# Patient Record
(demographics unavailable — no encounter records)

---

## 2024-11-01 NOTE — HISTORY OF PRESENT ILLNESS
[FreeTextEntry6] : cold x 1 week cough x1 week losing voice today sore throat, but no trouble swallowing no fever no N/V/D dad with bronchitis, mom with sinus infection

## 2024-11-01 NOTE — PHYSICAL EXAM
[Clear to Auscultation Bilaterally] : clear to auscultation bilaterally [NL] : warm, clear [FreeTextEntry7] : diminoished breath sounds LLL but otherwise CTAB

## 2024-11-01 NOTE — DISCUSSION/SUMMARY
[FreeTextEntry1] : 16 year old with cough x >1 week dad on zpack for lung infection, mom with sinusitits patient has swim competition no signs of sinus infection will start zpack for atypical PNA, but if no improvement she needs to be evaluated again mother agrees

## 2024-11-19 NOTE — HISTORY OF PRESENT ILLNESS
[de-identified] : Sore throat [FreeTextEntry6] : 3 days sore throat achey, cough, some congestion No fevers  For past 2 months been feeling tired and run down

## 2024-11-19 NOTE — DISCUSSION/SUMMARY
[FreeTextEntry1] : 15 yo w sore throat. Rapid strep is negative. Will send culture. Mom declines viral testing. In terms of the ongoing tiredness, explained to mom don't want to send labs while in the middle of acute viral illness. See how she feels after this illness and if still having concerns return for re-eval. Had normal CBC, TFTs, CMP a few months ago. Periods are regular LMP was 2 weeks ago.

## 2025-01-13 NOTE — HISTORY OF PRESENT ILLNESS
[FreeTextEntry6] : went to UC last week feeling run down and feeling tired no fevers  went to the ER September and Novemeber for ruptured ovarian cysts- Saw GYN who recommended OCP but patient declined

## 2025-02-26 NOTE — HISTORY OF PRESENT ILLNESS
[de-identified] : NIKKO Brewster note-  02/21/2025: ([employer]) She presents with her mother for orthopedic evaluation. States 2+ week history of worsening lower back pain. Denies specific injury. She is now noting lateral right thigh radicular symptoms over the last 48 hours. She denies bowel or bladder complaints. She is limping and having difficulties with activities of daily living. She has tried ibuprofen 400 mg with mild help.  2/21/25: Lumbar MRI - report noted in chart.   Ind. review- R paracentral HNP L5/S1 ======================================================================== 02/26/2025: Pt presents with lower back pain, that has been present for years, recently worsened after Pilates. Now with radiating pain and tingling down b/l legs. She presented [facility] had [facility] completed, presents for f/u. Pain is 80/20 RLE>LLE.

## 2025-02-26 NOTE — IMAGING
[de-identified] : LSPINE Inspection: No rash or ecchymosis Palpation: No tenderness to palpation or spasm in bilateral thoracic and lumbar paraspinal musculature, no SI joint tenderness to palpation ROM: Full with no pain Strength: 5/5 bilateral hip flexors, knee extensors, ankle dorsiflexors, EHL, ankle plantarflexors Sensation: Sensation present to light touch bilateral L2-S1 distributions Provocative maneuvers: Positive bilateral straight leg raise

## 2025-02-26 NOTE — HISTORY OF PRESENT ILLNESS
[de-identified] : NIKKO Brewster note-  02/21/2025: ([employer]) She presents with her mother for orthopedic evaluation. States 2+ week history of worsening lower back pain. Denies specific injury. She is now noting lateral right thigh radicular symptoms over the last 48 hours. She denies bowel or bladder complaints. She is limping and having difficulties with activities of daily living. She has tried ibuprofen 400 mg with mild help.  2/21/25: Lumbar MRI - report noted in chart.   Ind. review- R paracentral HNP L5/S1 ======================================================================== 02/26/2025: Pt presents with lower back pain, that has been present for years, recently worsened after Pilates. Now with radiating pain and tingling down b/l legs. She presented [facility] completed, presents for f/u. Pain is 80/20 RLE>LLE.

## 2025-02-26 NOTE — IMAGING
[de-identified] : LSPINE Inspection: No rash or ecchymosis Palpation: No tenderness to palpation or spasm in bilateral thoracic and lumbar paraspinal musculature, no SI joint tenderness to palpation ROM: Full with no pain Strength: 5/5 bilateral hip flexors, knee extensors, ankle dorsiflexors, EHL, ankle plantarflexors Sensation: Sensation present to light touch bilateral L2-S1 distributions Provocative maneuvers: Positive bilateral straight leg raise

## 2025-02-26 NOTE — ASSESSMENT
[FreeTextEntry1] : R paracentral HNP L5/S1 Discussed indication for surgery If sxs don't improve, then referral to PM would be needed  PT, meds  f/u 6 weeks  NSAIDs- Patient warned of risk of medication to GI tract, increased blood pressure, cardiac risk, and risk of fluid retention.  Advised to clear medication with internist or PCP if any concurrent health problem with heart, blood pressure, or GI system exists.

## 2025-03-05 NOTE — DEVELOPMENTAL MILESTONES
[Verbally] : verbally [Normal] : Developmental history within normal limits [See scanned document for history] : See scanned document for history [Roll Over: ___ Months] : Roll Over: [unfilled] months [Sit Up: ___ Months] : Sit Up: [unfilled] months [Walk ___ Months] : Walk: [unfilled] months [Right] : right

## 2025-03-06 NOTE — PHYSICAL EXAM
[FreeTextEntry1] : Healthy appearing 16 year-old child. Awake, alert, in no acute distress. Pleasant and cooperative.  Eyes are clear with no sclera abnormalities. External ears, nose and mouth are clear.  Good respiratory effort with no audible wheezing without use of a stethoscope. Ambulates independently with no evidence of antalgia. Good coordination and balance. Able to get on and off exam table without difficulty.  Spine: Inspection of the skin reveals no cafe au lait spots or large birth marks. From behind, patient is well centered with head and shoulders appropriately aligned with pelvis.  Shoulders are even with no significant scapula or flank asymmetry. Spine is grossly midline and straight. On Nael's Forward Bend, there is mild left thoracolumbar prominence.  NTTP over spinous processes and paraspinal musculature. Full range of motion at cervical, thoracic and lumbar spine with no pain or difficulty. No pelvic obliquity. No LLD Limited forward bend with cross leg exam  LE: Skin clean and intact. No deformity or lymphedema. Full ROM bilateral hips, knees and ankles.  Positive SLR bilaterally.  Hamstring tightness.  Positive MALACHI bilaterally Pain posteriorly along the glutes.  Popliteal angle of 45 on the right and 35 on the left. 5/5 motor strength in LE. SILT distally. Brisk symmetric reflexes at Patellar and Achilles' tendons No clonus. DP 2+, BCR < 2 seconds  Abdominal reflexes are symmetric and present.

## 2025-03-06 NOTE — REASON FOR VISIT
[Patient] : patient [Mother] : mother [Consultation] : a consultation visit [FreeTextEntry1] : back injury, scoliosis check

## 2025-03-06 NOTE — BIRTH HISTORY
[Unremarkable] : Unremarkable [Duration: ___ wks] : duration: [unfilled] weeks [___ lbs.] : [unfilled] lbs [___ oz.] : [unfilled] oz. [Normal?] : delivery not normal [Was child in NICU?] : Child was not in NICU [FreeTextEntry6] : emergency

## 2025-03-06 NOTE — HISTORY OF PRESENT ILLNESS
[FreeTextEntry1] : Ana Maria is a 16-year-old female who presents today with her mother for initial evaluation of her spine. Mother reports patient had back pain for a while. Back pain worsened 4 weeks ago when patient was attending Pilates. She noticed after the 4th day of Pilates, an intense lower back pain. Pain worsened to the point she couldn't walk. Patient is also a competitive swimmer and has not been able swim for long periods of time due to the pain. Patient also notes later right thigh tingling. Patient was initially seen by Félix Prescott on 02/21/2025, who obtained both spine x-rays and a lumbar MRI. Both imaging are available for review today. Félix Prescott recommended physical therapy. Mother and patient are here today in search of additional options for management. She has not gone to physical therapy prior today's visit. She is taking Ibuprofen for pain. Patient denies any recent fevers, chills, or night sweats. She denies any numbness, weakness to LE, or bladder/bowel dysfunction.  Here today for further orthopedic evaluation.

## 2025-03-06 NOTE — DATA REVIEWED
[de-identified] : My review and interpretation of the radiologic studies: MRI LUMBAR SPINE WITHOUT CONTRAST done at [facility] on 02/21/2025. IMPRESSION: Moderate disc degeneration at L5-S1 with broad-based central disc protrusion and superimposed on 2 mm retrolisthesis of L5 on S1. There is no associated spinal canal or foraminal stenosis.

## 2025-03-06 NOTE — ASSESSMENT
[FreeTextEntry1] : Ana Maria is a 16-year-old female with spinal asymmetry with acute lower back pain.   Today's assessment was performed with the assistance of the patient's parent as an independent historian given the patient's age. Clinical findings and MRI results were reviewed at length with the patient and parent.  We had a thorough talk in regards to the diagnosis, prognosis and treatment modalities. MRI of the lumbar spine reveals moderate disc degeneration at L5-S1 with broad-based central disc protrusion and superimposed on 2 mm retrolisthesis of L5 on S1. There is no associated spinal canal or foraminal stenosis. At this time, I am recommending the patient begin attending physical therapy sessions for improving flexibility of her gluteal complex, iliotibial band, and hamstrings; a new prescription was provided to family today.  She may also benefit from modalities which include deep tissue massage, TENS, and otherpain management modalities. In addition, I am recommending patient begin wearing a lumbosacral corset brace for further support. Brace care instructions reviewed. Patient was seen by Steven during today's office visit. Activity modification was also discussed. I recommend patient takes rest from swimming this week. Activities as tolerated within limits of pain and comfort. All questions and concerns were addressed. The family vocalized understanding and agreement to assessment and treatment plan. If her pain does not improve within 6 weeks, we may consider obtaining a CT scan to evaluate for spondylolysis.   Documented by Dalila Ugarte acting as a scribe for Dr. Hawkins on 03/05/2025. 		  The above documentation completed by the scribe is an accurate record of both my words and actions.

## 2025-03-06 NOTE — DATA REVIEWED
[de-identified] : My review and interpretation of the radiologic studies: MRI LUMBAR SPINE WITHOUT CONTRAST done at [facility] on 02/21/2025. IMPRESSION: Moderate disc degeneration at L5-S1 with broad-based central disc protrusion and superimposed on 2 mm retrolisthesis of L5 on S1. There is no associated spinal canal or foraminal stenosis.

## 2025-03-06 NOTE — REVIEW OF SYSTEMS
[Back Pain] : ~T back pain [NI] : Endocrine [Nl] : Hematologic/Lymphatic [No Acute Changes] : No acute changes since previous visit [Change in Activity] : no change in activity [Fever Above 102] : no fever [Malaise] : no malaise [Rash] : no rash [Murmur] : no murmur [Cough] : no cough

## 2025-04-09 NOTE — IMAGING
[de-identified] : LSPINE Inspection: No rash or ecchymosis Palpation: No tenderness to palpation or spasm in bilateral thoracic and lumbar paraspinal musculature, no SI joint tenderness to palpation ROM: Full with no pain Strength: 5/5 bilateral hip flexors, knee extensors, ankle dorsiflexors, EHL, ankle plantarflexors Sensation: Sensation present to light touch bilateral L2-S1 distributions Provocative maneuvers: - bilateral straight leg raise

## 2025-04-09 NOTE — HISTORY OF PRESENT ILLNESS
[de-identified] : NIKKO Brewster note-  02/21/2025: ([employer]) She presents with her mother for orthopedic evaluation. States 2+ week history of worsening lower back pain. Denies specific injury. She is now noting lateral right thigh radicular symptoms over the last 48 hours. She denies bowel or bladder complaints. She is limping and having difficulties with activities of daily living. She has tried ibuprofen 400 mg with mild help.  2/21/25: Lumbar MRI - report noted in chart.   Ind. review- R paracentral HNP L5/S1 ======================================================================== 02/26/2025: Pt presents with lower back pain, that has been present for years, recently worsened after Pilates. Now with radiating pain and tingling down b/l legs. [facility] completed, presents for f/u. Pain is 80/20 RLE>LLE.  4/9/25- pain improves with PT 2x a week. She is taking Ibuprofen. She is going to attempt going back to swimming.